# Patient Record
Sex: MALE | Race: BLACK OR AFRICAN AMERICAN | NOT HISPANIC OR LATINO | URBAN - NONMETROPOLITAN AREA
[De-identification: names, ages, dates, MRNs, and addresses within clinical notes are randomized per-mention and may not be internally consistent; named-entity substitution may affect disease eponyms.]

---

## 2023-08-23 ENCOUNTER — TELEPHONE (OUTPATIENT)
Dept: FAMILY MEDICINE CLINIC | Facility: CLINIC | Age: 58
End: 2023-08-23

## 2023-08-24 NOTE — TELEPHONE ENCOUNTER
Incoming Refill Request      Medication requested (name and dose):     Pharmacy where request should be sent:     Additional details provided by patient:     Best call back number:     Does the patient have less than a 3 day supply:  [] Yes  [] No    Stew Barrera Rep  08/24/23, 09:42 EDT           Initial Clinical Review    Admission: Date/Time/Statement: 2/11/19 @ 2212   Orders Placed This Encounter   Procedures    Inpatient Admission (expected length of stay for this patient Order details is greater than two midnights)     Standing Status:   Standing     Number of Occurrences:   1     Order Specific Question:   Admitting Physician     Answer:   Yazan Mcleod [[id number]]     Order Specific Question:   Level of Care     Answer:   Med Surg [16]     Order Specific Question:   Estimated length of stay     Answer:   More than 2 Midnights     Order Specific Question:   Certification     Answer:   I certify that inpatient services are medically necessary for this patient for a duration of greater than two midnights  See H&P and MD Progress Notes for additional information about the patient's course of treatment  ED: Date/Time/Mode of Arrival:   ED Arrival Information     Expected Arrival Acuity Means of Arrival Escorted By Service Admission Type    - 2/11/2019 20:52 [facility]gent [facility]) General Medicine Urgent    Arrival Complaint    CONFUSED, SOB        Chief Complaint:   Chief Complaint   Patient presents with    Altered Mental Status     patient presents to the ED via EMS  EMS reports patient has had increased altered mental status this evening and trouble urinating  Patient was recently seen in the ED for a fall  History of Illness:   83 yo M with PMH of afib, CAD, CHF, COPD presents with family for evaluation for hospice  They report he is more confused and combative compared to normal  No specific complaints  No dyspnea  Leg swelling better than it usually is  No recent fall since eval last week for fall    ED Vital Signs:   ED Triage Vitals [02/11/19 2100]   Temperature Pulse Respirations Blood Pressure SpO2   99 9 °F (37 7 °C) 62 20 127/95 95 %      Temp Source Heart Rate Source Patient Position - Orthostatic VS BP Location FiO2 (%)   Temporal Monitor Lying Left arm --      Pain Score       No Pain        Wt Readings from Last 1 Encounters:   02/11/19 79 4 kg (175 lb)     Vital Signs (abnormal):   Pertinent Labs/Diagnostic Test Results:   ED Treatment:   Medication Administration from 02/11/2019 2052 to 02/12/2019 1029       Date/Time Order Dose Route Action Action by Comments     02/11/2019 2120 OLANZapine (ZyPREXA ZYDIS) dispersible tablet 5 mg 5 mg Oral Given Mattie Sharp RN      02/12/2019 7514 aspirin chewable tablet 81 mg 81 mg Oral Not Given Mary Gilmore RN Per Dr Trace Hill     02/12/2019 8032 furosemide (LASIX) tablet 80 mg 80 mg Oral Not Given Mary Gilmore RN Per Dr Kimber Gilman order patient on comfort care  02/12/2019 0407 melatonin tablet 3 mg 3 mg Oral Not Given Mattie Sharp RN      02/12/2019 3259 metoprolol tartrate (LOPRESSOR) tablet 50 mg 50 mg Oral Not Given Mary Gilmore RN Per Dr Drea Perrin verbal order patient on comfort care  02/12/2019 0929 lisinopril (ZESTRIL) tablet 40 mg 40 mg Oral Not Given Mary Gilmore RN Per Dr Drea Perrin verbal order patient on comfort care  02/12/2019 0930 senna (SENOKOT) tablet 8 6 mg 8 6 mg Oral Not Given Mary Gilmore RN Per Dr Drea Perrin verbal order patient on comfort care       02/12/2019 0937 sodium chloride 0 9 % infusion 50 mL/hr Intravenous MISHA Gauthier      02/12/2019 0612 sodium chloride 0 9 % infusion 0 mL/hr Intravenous Stopped Mattie Sharp RN      02/12/2019 0005 sodium chloride 0 9 % infusion 50 mL/hr Intravenous New Bag Mattie Sharp RN      02/12/2019 0930 LORazepam (ATIVAN) 2 mg/mL injection 1 mg 1 mg Intravenous Not Given Mary Gilmore RN given at 09:15am     02/12/2019 0608 LORazepam (ATIVAN) 2 mg/mL injection 1 mg 1 mg Intravenous Given Mattie Sharp RN      02/11/2019 2337 LORazepam (ATIVAN) 2 mg/mL injection 1 mg 1 mg Intravenous Given Mattie Sharp RN      02/12/2019 0906 glycopyrrolate (ROBINUL) injection 0 1 mg 0 1 mg Intravenous Given Mary Gilmore RN 02/12/2019 0907 morphine (PF) 4 mg/mL injection 4 mg 4 mg Intravenous Given Chano Davis RN      02/12/2019 0906 LORazepam (ATIVAN) 2 mg/mL injection 2 mg 2 mg Intravenous Given Chano Davis RN         Past Medical/Surgical History: Active Ambulatory Problems     Diagnosis Date Noted    Atrial fibrillation ([facility] ) 06/30/2016    ASCVD (arteriosclerotic cardiovascular disease) 06/30/2016    AICD (automatic cardioverter/defibrillator) present 06/30/2016    Hypertension 06/30/2016    Hyperlipidemia 06/30/2016    Peripheral artery disease ([facility] ) 06/30/2016    Chronic systolic congestive heart failure ([place] [address] ) 07/04/2016    COPD (chronic obstructive pulmonary disease) ([place] [address] )     Aortic stenosis, moderate 07/31/2016    Asthma 02/11/2019    Ischemic cardiomyopathy 02/11/2019    Myocardial infarction ([place] [address] ) 02/11/2019     Resolved Ambulatory Problems     Diagnosis Date Noted    Acute systolic CHF (congestive heart failure) ([place] [address] ) 07/01/2016    Atrial fibrillation with rapid ventricular response ([place] [address] ) 07/01/2016    Non-ST elevation myocardial infarction (NSTEMI) ([place] [address] ) 07/01/2016    CHF [facility]congestive heart failure) ([facility])     A-fib ([place] [address] )     Cardiac disease      Past Medical History:   Diagnosis Date    A-fib [facility])     Cardiac disease     CHF [facility]congestive heart failure) ([facility])     COPD (chronic obstructive pulmonary disease) ([facility])     Hypertension      Admitting Diagnosis: SOB (shortness of breath) [R06 02]  Confused [R41 0]  Age/Sex: 82 y o  male  Assessment/Plan:   Altered mental status  Assessment & Plan  Hospice Consult  IVF and antibiotics are allowed per medical POA to make patient more comfortable  UA pending     AICD (automatic cardioverter/defibrillator) present  Assessment & Plan  [employer] states:  Tape a magnet over the pacemaker  to remove the defibrillator function  Once determination made for hospice care they will remotely turn off the pacemaker        Chronic systolic congestive heart failure (HCC)  Assessment & Plan  Continue Metoprolol tartrate 50 mg po qd     COPD (chronic obstructive pulmonary disease) ([facility])  Assessment & Plan  Continue albuterol q6h prn wheezing     Hypertension  Assessment & Plan  Continue home meds:  Lasix 80 mg p o  B i d   Metoprolol tartrate 50 mg p o  B i d   Quinapril 40 mg p o   HS  Admission Orders:  MED SURG  COMFORT MEASURES  Scheduled Meds:   Current Facility-Administered Medications:  acetaminophen 650 mg Oral Q6H PRN Katheren Mona, PA-C    albuterol 2 puff Inhalation Q6H PRN Katheren Mona, PA-C    aspirin 81 mg Oral Daily Katheren Mona, PA-C    calcium carbonate 1,000 mg Oral Daily PRN Katheren Mona, PA-C    furosemide 40 mg Oral BID Alondra Tay, DO    LORazepam 1 mg Intravenous PRN Jami Banegas MD    melatonin 3 mg Oral HS Katgeraldine Dunn, PA-C    methylPREDNISolone sodium succinate 40 mg Intravenous Q12H [facility] Rosalia Fly, DO    metoprolol tartrate 50 mg Oral BID Tyler Dunn, PA-C    morphine 10 mg Oral Q4H PRN Rosalia Fly, DO    OLANZapine 5 mg Oral HS Katheren Mona, PA-C    scopolamine 1 patch Transdermal Q72H Rosalia Fly, DO    senna 1 tablet Oral Daily Kathereviolet Dunn, PA-C    sodium chloride 50 mL/hr Intravenous Continuous Tyler Dunn PA-C Last Rate: 50 mL/hr (02/12/19 0937)     Continuous Infusions:   sodium chloride 50 mL/hr Last Rate: 50 mL/hr (02/12/19 0937)     PRN Meds:   acetaminophen    albuterol    calcium carbonate    LORazepam    morphine